# Patient Record
Sex: MALE | Race: WHITE | NOT HISPANIC OR LATINO | Employment: UNEMPLOYED | URBAN - METROPOLITAN AREA
[De-identification: names, ages, dates, MRNs, and addresses within clinical notes are randomized per-mention and may not be internally consistent; named-entity substitution may affect disease eponyms.]

---

## 2022-06-30 DIAGNOSIS — U07.1 COVID-19 VIRUS DETECTED: ICD-10-CM

## 2022-07-04 ENCOUNTER — NURSE TRIAGE (OUTPATIENT)
Dept: ADMINISTRATIVE | Facility: CLINIC | Age: 62
End: 2022-07-04

## 2022-07-04 NOTE — TELEPHONE ENCOUNTER
Called re [facility] program, patient concerned as he still has a slight sore throat and cough. He will be finished with his medication tonight.  Triage done- dispo home care. Patient reassured re improvement of symptoms and could last as long as 3 weeks. Verb understanding. Instructed to call back for any further questions or concerns or worsening S/S.   Reason for Disposition   [1] COVID-19 diagnosed by doctor (or NP/PA) AND [2] mild symptoms (e.g., cough, fever, others) AND [3] no complications or SOB    Additional Information   Negative: SEVERE difficulty breathing (e.g., struggling for each breath, speaks in single words)   Negative: Difficult to awaken or acting confused (e.g., disoriented, slurred speech)   Negative: Bluish (or gray) lips or face now   Negative: Shock suspected (e.g., cold/pale/clammy skin, too weak to stand, low BP, rapid pulse)   Negative: Sounds like a life-threatening emergency to the triager   Negative: SEVERE or constant chest pain or pressure  (Exception: Mild central chest pain, present only when coughing.)   Negative: MODERATE difficulty breathing (e.g., speaks in phrases, SOB even at rest, pulse 100-120)   Negative: [1] Headache AND [2] stiff neck (can't touch chin to chest)   Negative: Oxygen level (e.g., pulse oximetry) 90 percent or lower   Negative: Chest pain or pressure   Negative: Patient sounds very sick or weak to the triager   Negative: MILD difficulty breathing (e.g., minimal/no SOB at rest, SOB with walking, pulse <100)   Negative: Fever > 103 F (39.4 C)   Negative: [1] Fever > 101 F (38.3 C) AND [2] age > 60 years   Negative: [1] Fever > 100.0 F (37.8 C) AND [2] bedridden (e.g., nursing home patient, CVA, chronic illness, recovering from surgery)   Negative: HIGH RISK for severe COVID complications (e.g., weak immune system, age > 64 years, obesity with BMI > 25, pregnant, chronic lung disease or other chronic medical condition)  (Exception: Already seen by  PCP and no new or worsening symptoms.)   Negative: [1] HIGH RISK patient AND [2] influenza is widespread in the community AND [3] ONE OR MORE respiratory symptoms: cough, sore throat, runny or stuffy nose   Negative: [1] HIGH RISK patient AND [2] influenza exposure within the last 7 days AND [3] ONE OR MORE respiratory symptoms: cough, sore throat, runny or stuffy nose   Negative: Oxygen level (e.g., pulse oximetry) 91 to 94 percent   Negative: Fever present > 3 days (72 hours)   Negative: [1] Fever returns after gone for over 24 hours AND [2] symptoms worse or not improved   Negative: [1] Continuous (nonstop) coughing interferes with work or school AND [2] no improvement using cough treatment per Care Advice   Negative: [1] COVID-19 infection suspected by caller or triager AND [2] mild symptoms (cough, fever, or others) AND [3] negative COVID-19 rapid test   Negative: Cough present > 3 weeks   Negative: [1] COVID-19 diagnosed by positive lab test (e.g., PCR, rapid self-test kit) AND [2] NO symptoms (e.g., cough, fever, others)   Negative: [1] COVID-19 diagnosed by positive lab test (e.g., PCR, rapid self-test kit) AND [2] mild symptoms (e.g., cough, fever, others) AND [3] no complications or SOB    Protocols used: CORONAVIRUS (COVID-19) DIAGNOSED OR [id number]

## 2022-07-15 PROBLEM — R79.89 LOW SERUM TESTOSTERONE: Status: ACTIVE | Noted: 2022-07-15

## 2022-09-27 PROBLEM — Z12.5 SCREENING PSA (PROSTATE SPECIFIC ANTIGEN): Status: ACTIVE | Noted: 2022-09-27
